# Patient Record
Sex: MALE | Race: WHITE
[De-identification: names, ages, dates, MRNs, and addresses within clinical notes are randomized per-mention and may not be internally consistent; named-entity substitution may affect disease eponyms.]

---

## 2017-03-21 ENCOUNTER — HOSPITAL ENCOUNTER (OUTPATIENT)
Dept: HOSPITAL 45 - C.LABBC | Age: 67
Discharge: HOME | End: 2017-03-21
Attending: INTERNAL MEDICINE
Payer: COMMERCIAL

## 2017-03-21 DIAGNOSIS — E05.90: ICD-10-CM

## 2017-03-21 DIAGNOSIS — I10: Primary | ICD-10-CM

## 2017-03-21 DIAGNOSIS — E11.9: ICD-10-CM

## 2017-03-21 DIAGNOSIS — D64.9: ICD-10-CM

## 2017-03-21 LAB
ANION GAP SERPL CALC-SCNC: 8 MMOL/L (ref 3–11)
BASOPHILS # BLD: 0.02 K/UL (ref 0–0.2)
BASOPHILS NFR BLD: 0.3 %
BUN SERPL-MCNC: 22 MG/DL (ref 7–18)
BUN/CREAT SERPL: 27 (ref 10–20)
CALCIUM SERPL-MCNC: 9.3 MG/DL (ref 8.5–10.1)
CHLORIDE SERPL-SCNC: 105 MMOL/L (ref 98–107)
CO2 SERPL-SCNC: 26 MMOL/L (ref 21–32)
COMPLETE: YES
CREAT SERPL-MCNC: 0.83 MG/DL (ref 0.6–1.4)
EOSINOPHIL NFR BLD AUTO: 207 K/UL (ref 130–400)
GLUCOSE SERPL-MCNC: 132 MG/DL (ref 70–99)
HCT VFR BLD CALC: 36.5 % (ref 42–52)
IG%: 0.5 %
IMM GRANULOCYTES NFR BLD AUTO: 18.3 %
LYMPHOCYTES # BLD: 1.35 K/UL (ref 1.2–3.4)
MCH RBC QN AUTO: 33.2 PG (ref 25–34)
MCHC RBC AUTO-ENTMCNC: 35.3 G/DL (ref 32–36)
MCV RBC AUTO: 94.1 FL (ref 80–100)
MONOCYTES NFR BLD: 6.8 %
NEUTROPHILS # BLD AUTO: 1.2 %
NEUTROPHILS NFR BLD AUTO: 72.9 %
PMV BLD AUTO: 10.1 FL (ref 7.4–10.4)
POTASSIUM SERPL-SCNC: 4.6 MMOL/L (ref 3.5–5.1)
RBC # BLD AUTO: 3.88 M/UL (ref 4.7–6.1)
SODIUM SERPL-SCNC: 139 MMOL/L (ref 136–145)
TSH SERPL-ACNC: 0.44 UIU/ML (ref 0.3–4.5)
WBC # BLD AUTO: 7.37 K/UL (ref 4.8–10.8)

## 2017-03-28 NOTE — CODING QUERY MEDICAL NECESSITY
SUPPORTING DIAGNOSIS NEEDED





A supporting diagnosis is required for the test/procedure performed on this patient in 
order for us to be reimbursed by the patient's insurance. Please provide a supporting 
diagnosis for the following test/procedure listed below next to the test name along with 
your signature. 



*If there is no additional diagnosis for this patient that would support the following 
test/procedure please document that below next to the test/procedure.



Test(s)/Procedure(s) that require a supporting diagnosis:

DOS 3/21

* Vitamin B12      DIAGNOSIS:



Provider Signature:  ______________________________  Date:  _______



Thank you  

Margaret Bae

Health Information Management

Phone:  [phone]

Fax:  [phone]



Once completed, please kindly fax back to [phone]



For questions please call [phone]

## 2017-10-13 ENCOUNTER — HOSPITAL ENCOUNTER (OUTPATIENT)
Dept: HOSPITAL 45 - C.LABBC | Age: 67
Discharge: HOME | End: 2017-10-13
Attending: INTERNAL MEDICINE
Payer: COMMERCIAL

## 2017-10-13 DIAGNOSIS — E05.90: ICD-10-CM

## 2017-10-13 DIAGNOSIS — Z11.59: ICD-10-CM

## 2017-10-13 DIAGNOSIS — I10: ICD-10-CM

## 2017-10-13 DIAGNOSIS — E11.9: ICD-10-CM

## 2017-10-13 DIAGNOSIS — Z00.00: Primary | ICD-10-CM

## 2017-10-13 DIAGNOSIS — K22.70: ICD-10-CM

## 2017-10-13 DIAGNOSIS — D64.9: ICD-10-CM

## 2017-10-13 DIAGNOSIS — E78.5: ICD-10-CM

## 2017-10-13 LAB
ALBUMIN/GLOB SERPL: 1.1 {RATIO} (ref 0.9–2)
ALP SERPL-CCNC: 68 U/L (ref 45–117)
ALT SERPL-CCNC: 30 U/L (ref 12–78)
ANION GAP SERPL CALC-SCNC: 10 MMOL/L (ref 3–11)
AST SERPL-CCNC: 20 U/L (ref 15–37)
BASOPHILS # BLD: 0.03 K/UL (ref 0–0.2)
BASOPHILS NFR BLD: 0.4 %
BUN SERPL-MCNC: 21 MG/DL (ref 7–18)
BUN/CREAT SERPL: 27.3 (ref 10–20)
CALCIUM SERPL-MCNC: 8.8 MG/DL (ref 8.5–10.1)
CHLORIDE SERPL-SCNC: 106 MMOL/L (ref 98–107)
CHOLEST/HDLC SERPL: 3.1 {RATIO}
CO2 SERPL-SCNC: 24 MMOL/L (ref 21–32)
COMPLETE: YES
CREAT SERPL-MCNC: 0.78 MG/DL (ref 0.6–1.4)
EOSINOPHIL NFR BLD AUTO: 228 K/UL (ref 130–400)
EST. AVERAGE GLUCOSE BLD GHB EST-MCNC: 114 MG/DL
GLOBULIN SER-MCNC: 3.4 GM/DL (ref 2.5–4)
GLUCOSE SERPL-MCNC: 94 MG/DL (ref 70–99)
GLUCOSE UR QL: 56 MG/DL
HCT VFR BLD CALC: 37.5 % (ref 42–52)
IG%: 0.9 %
IMM GRANULOCYTES NFR BLD AUTO: 26.4 %
KETONES UR QL STRIP: 85 MG/DL
LYMPHOCYTES # BLD: 1.81 K/UL (ref 1.2–3.4)
MCH RBC QN AUTO: 32.8 PG (ref 25–34)
MCHC RBC AUTO-ENTMCNC: 34.4 G/DL (ref 32–36)
MCV RBC AUTO: 95.4 FL (ref 80–100)
MONOCYTES NFR BLD: 7.6 %
NEUTROPHILS # BLD AUTO: 1 %
NEUTROPHILS NFR BLD AUTO: 63.7 %
NITRITE UR QL STRIP: 158 MG/DL (ref 0–150)
PH UR: 173 MG/DL (ref 0–200)
PMV BLD AUTO: 10 FL (ref 7.4–10.4)
POTASSIUM SERPL-SCNC: 4.1 MMOL/L (ref 3.5–5.1)
PSA SERPL-MCNC: 0.32 NG/ML (ref 0–4)
RBC # BLD AUTO: 3.93 M/UL (ref 4.7–6.1)
SODIUM SERPL-SCNC: 140 MMOL/L (ref 136–145)
TSH SERPL-ACNC: 0.33 UIU/ML (ref 0.3–4.5)
VERY LOW DENSITY LIPOPROT CALC: 32 MG/DL
WBC # BLD AUTO: 6.86 K/UL (ref 4.8–10.8)

## 2017-11-13 ENCOUNTER — HOSPITAL ENCOUNTER (OUTPATIENT)
Dept: HOSPITAL 45 - C.PATHSPEC | Age: 67
Discharge: HOME | End: 2017-11-13
Attending: DERMATOLOGY
Payer: COMMERCIAL

## 2017-11-13 DIAGNOSIS — L82.1: Primary | ICD-10-CM

## 2018-05-04 ENCOUNTER — HOSPITAL ENCOUNTER (OUTPATIENT)
Dept: HOSPITAL 45 - C.LABBC | Age: 68
Discharge: HOME | End: 2018-05-04
Attending: INTERNAL MEDICINE
Payer: COMMERCIAL

## 2018-05-04 DIAGNOSIS — I10: Primary | ICD-10-CM

## 2018-05-04 DIAGNOSIS — E11.9: ICD-10-CM

## 2018-05-04 DIAGNOSIS — E05.90: ICD-10-CM

## 2018-05-04 DIAGNOSIS — D64.9: ICD-10-CM

## 2018-05-04 LAB
BASOPHILS # BLD: 0.03 K/UL (ref 0–0.2)
BASOPHILS NFR BLD: 0.5 %
BUN SERPL-MCNC: 22 MG/DL (ref 7–18)
CALCIUM SERPL-MCNC: 9 MG/DL (ref 8.5–10.1)
CO2 SERPL-SCNC: 26 MMOL/L (ref 21–32)
CREAT SERPL-MCNC: 0.77 MG/DL (ref 0.6–1.4)
EOS ABS #: 0.07 K/UL (ref 0–0.5)
EOSINOPHIL NFR BLD AUTO: 234 K/UL (ref 130–400)
GLUCOSE SERPL-MCNC: 90 MG/DL (ref 70–99)
HCT VFR BLD CALC: 37 % (ref 42–52)
HGB BLD-MCNC: 13.2 G/DL (ref 14–18)
IG#: 0.03 K/UL (ref 0–0.02)
IMM GRANULOCYTES NFR BLD AUTO: 26.7 %
LYMPHOCYTES # BLD: 1.59 K/UL (ref 1.2–3.4)
MCH RBC QN AUTO: 33.8 PG (ref 25–34)
MCHC RBC AUTO-ENTMCNC: 35.7 G/DL (ref 32–36)
MCV RBC AUTO: 94.6 FL (ref 80–100)
MONO ABS #: 0.4 K/UL (ref 0.11–0.59)
MONOCYTES NFR BLD: 6.7 %
NEUT ABS #: 3.84 K/UL (ref 1.4–6.5)
NEUTROPHILS # BLD AUTO: 1.2 %
NEUTROPHILS NFR BLD AUTO: 64.4 %
PMV BLD AUTO: 10 FL (ref 7.4–10.4)
POTASSIUM SERPL-SCNC: 3.9 MMOL/L (ref 3.5–5.1)
RED CELL DISTRIBUTION WIDTH CV: 12.7 % (ref 11.5–14.5)
RED CELL DISTRIBUTION WIDTH SD: 43.5 FL (ref 36.4–46.3)
SODIUM SERPL-SCNC: 136 MMOL/L (ref 136–145)
WBC # BLD AUTO: 5.96 K/UL (ref 4.8–10.8)

## 2018-05-05 LAB — HBA1C MFR BLD: 5.7 % (ref 4.5–5.6)

## 2018-08-06 ENCOUNTER — HOSPITAL ENCOUNTER (OUTPATIENT)
Dept: HOSPITAL 45 - C.RADBC | Age: 68
Discharge: HOME | End: 2018-08-06
Attending: INTERNAL MEDICINE
Payer: COMMERCIAL

## 2018-08-06 DIAGNOSIS — I10: Primary | ICD-10-CM

## 2018-08-06 DIAGNOSIS — E11.9: ICD-10-CM

## 2018-08-06 DIAGNOSIS — M54.40: ICD-10-CM

## 2018-08-06 NOTE — DIAGNOSTIC IMAGING REPORT
SI JOINTS 3 OR MORE VIEWS



CLINICAL HISTORY: I10 [id number] Type 2 diabetes [id number]    



COMPARISON STUDY:  No previous studies for comparison.



FINDINGS: Minimal degenerative change signal x-rays. Moderate degenerative

change right hip. Sacral foramina are symmetric.



IMPRESSION:  Mild to moderate degenerative change. No acute process. 











The above report was generated using voice recognition software.  It may contain

grammatical, syntax or spelling errors.







Electronically signed by:  Dirk Hackett M.D.

8/6/2018 1:11 PM



Dictated Date/Time:  8/6/2018 1:10 PM

## 2018-08-07 ENCOUNTER — HOSPITAL ENCOUNTER (OUTPATIENT)
Dept: HOSPITAL 45 - C.RADBC | Age: 68
Discharge: HOME | End: 2018-08-07
Attending: INTERNAL MEDICINE
Payer: COMMERCIAL

## 2018-08-07 DIAGNOSIS — M54.40: ICD-10-CM

## 2018-08-07 DIAGNOSIS — M51.36: Primary | ICD-10-CM

## 2018-08-07 NOTE — DIAGNOSTIC IMAGING REPORT
L-SPINE MIN 4 VIEWS ROUTINE



CLINICAL HISTORY: Lower back pain.    



COMPARISON: Lumbar spine radiographs August 6, 2018.



FINDINGS:  Vertebral body heights are maintained. There is no fracture or

suspicious lesion. There is slight leftward curvature of the lumbar spine. Note

is made of extensive anterior osteophytosis of lumbar spine with moderate

multilevel facet arthrosis. There is moderate disc space narrowing at L5-S1 and

L2-L3.



IMPRESSION: 



1. No acute lumbar spine fracture or subluxation.



2. Moderate multilevel degenerative disc disease and facet arthrosis.







Electronically signed by:  Lawson Stewart M.D.

8/7/2018 11:13 AM



Dictated Date/Time:  8/7/2018 11:09 AM

## 2018-08-09 ENCOUNTER — HOSPITAL ENCOUNTER (OUTPATIENT)
Dept: HOSPITAL 45 - C.MRIBC | Age: 68
Discharge: HOME | End: 2018-08-09
Attending: INTERNAL MEDICINE
Payer: COMMERCIAL

## 2018-08-09 DIAGNOSIS — E11.9: ICD-10-CM

## 2018-08-09 DIAGNOSIS — I10: ICD-10-CM

## 2018-08-09 DIAGNOSIS — M48.061: Primary | ICD-10-CM

## 2018-08-09 DIAGNOSIS — M51.26: ICD-10-CM

## 2018-08-09 NOTE — DIAGNOSTIC IMAGING REPORT
LUMBAR SPINE W/O CONTRAST



CLINICAL HISTORY: 68 years-old Male presenting with I10 EacjirshognkW25.9 Type 2

diabetes [id number] Low back pain, pain radiating down both legs for 3

months. 



TECHNIQUE: Multisequence, multiplanar MR imaging of the lumbar spine was

performed without the use of intravenous contrast. IV contrast: None. 



COMPARISON: Plain radiographs from 8/7/2018.



FINDINGS:



Localizer images: Unremarkable.



Slight straightening of normal lumbar lordosis. Vertebral bodies maintain normal

height and alignment. Endplate edema is evident eccentrically on the right and

posteriorly at L2-3, where there is disc desiccation and height loss. Additional

disc desiccation with lesser degrees of height loss at L4-5 and L5-S1. Further

details regarding multilevel degenerative changes below:



L1-2: No neural foraminal or spinal canal narrowing.



L2-3: Disc bulge, facet arthropathy, ligament flavum thickening result in severe

circumferential narrowing of the thecal sac appears no residual CSF signal

intensity. Moderate to severe bilateral neural foraminal narrowing with mass

effect on the bilateral exiting L2 nerve roots.



L3-4: No significant neural foraminal or spinal canal narrowing.



L4-5: Minimal disc bulge and facet arthropathy. Minimal effacement of the

ventral thecal sac. Abutment of the bilateral exiting L4 nerve roots. Moderate

to severe bilateral neural foraminal narrowing.



L5-S1: Annular fissure and mild disc bulge with facet arthropathy right greater

than left. Disc bulge abuts the exiting left L5 nerve root and transiting left

S1 nerve root due to greater effacement of the left lateral recess and left

neural foramen. Mild right and severe left neural foraminal narrowing.



Spinal cord ends in good position at L1. Cauda equina has a buckled appearance

with crowding at L2-3 as mentioned above. Paraspinal musculature within normal

limits without evidence of muscle edema. No gross evidence of an epidural

collection. Remaining visualized soft tissues within normal limits.



IMPRESSION:

1.  Severe spinal canal stenosis at L2-3 secondary to disc bulge, facet

arthropathy, and ligamentum flavum thickening. The morphology of the cauda

equina raises concern for cauda equina impingement. Correlate clinically.



2.  Multilevel neural foraminal narrowing with abutment and/or mass effect on

exiting nerve roots as above.



The report will be called/faxed according to standard departmental protocol.







Electronically signed by:  Guicho Riojas M.D.

8/9/2018 11:07 AM



Dictated Date/Time:  8/9/2018 11:02 AM